# Patient Record
Sex: FEMALE | Race: ASIAN | NOT HISPANIC OR LATINO | URBAN - METROPOLITAN AREA
[De-identification: names, ages, dates, MRNs, and addresses within clinical notes are randomized per-mention and may not be internally consistent; named-entity substitution may affect disease eponyms.]

---

## 2017-05-25 ENCOUNTER — OFFICE VISIT - HEALTHEAST (OUTPATIENT)
Dept: FAMILY MEDICINE | Facility: CLINIC | Age: 62
End: 2017-05-25

## 2017-05-25 ENCOUNTER — RECORDS - HEALTHEAST (OUTPATIENT)
Dept: MAMMOGRAPHY | Facility: CLINIC | Age: 62
End: 2017-05-25

## 2017-05-25 DIAGNOSIS — Z00.00 ENCOUNTER FOR GENERAL ADULT MEDICAL EXAMINATION WITHOUT ABNORMAL FINDINGS: ICD-10-CM

## 2017-05-25 DIAGNOSIS — Z00.00 ROUTINE GENERAL MEDICAL EXAMINATION AT A HEALTH CARE FACILITY: ICD-10-CM

## 2017-05-25 DIAGNOSIS — L72.3 SEBACEOUS CYST: ICD-10-CM

## 2017-05-25 DIAGNOSIS — Z12.31 ENCOUNTER FOR SCREENING MAMMOGRAM FOR MALIGNANT NEOPLASM OF BREAST: ICD-10-CM

## 2017-05-25 ASSESSMENT — MIFFLIN-ST. JEOR: SCORE: 890.72

## 2017-05-30 ENCOUNTER — AMBULATORY - HEALTHEAST (OUTPATIENT)
Dept: LAB | Facility: CLINIC | Age: 62
End: 2017-05-30

## 2017-05-30 DIAGNOSIS — Z00.00 ROUTINE GENERAL MEDICAL EXAMINATION AT A HEALTH CARE FACILITY: ICD-10-CM

## 2017-05-30 LAB
CHOLEST SERPL-MCNC: 227 MG/DL
FASTING STATUS PATIENT QL REPORTED: YES
HAV IGG SER QL IA: POSITIVE
HDLC SERPL-MCNC: 77 MG/DL
LDLC SERPL CALC-MCNC: 137 MG/DL
TRIGL SERPL-MCNC: 63 MG/DL

## 2017-05-31 LAB
HBV SURFACE AB SERPL IA-ACNC: NEGATIVE M[IU]/ML
HPV INTERPRETATION - HISTORICAL: NORMAL
HPV INTERPRETER - HISTORICAL: NORMAL

## 2017-06-01 ENCOUNTER — COMMUNICATION - HEALTHEAST (OUTPATIENT)
Dept: FAMILY MEDICINE | Facility: CLINIC | Age: 62
End: 2017-06-01

## 2017-06-01 LAB
BKR LAB AP ABNORMAL BLEEDING: NO
BKR LAB AP BIRTH CONTROL/HORMONES: NORMAL
BKR LAB AP CERVICAL APPEARANCE: NORMAL
BKR LAB AP GYN ADEQUACY: NORMAL
BKR LAB AP GYN INTERPRETATION: NORMAL
BKR LAB AP HPV REFLEX: NORMAL
BKR LAB AP LMP: NORMAL
BKR LAB AP PATIENT STATUS: NORMAL
BKR LAB AP PREVIOUS ABNORMAL: NORMAL
BKR LAB AP PREVIOUS NORMAL: 2013
HIGH RISK?: NO
PATH REPORT.COMMENTS IMP SPEC: NORMAL
RESULT FLAG (HE HISTORICAL CONVERSION): NORMAL

## 2017-06-11 ENCOUNTER — COMMUNICATION - HEALTHEAST (OUTPATIENT)
Dept: FAMILY MEDICINE | Facility: CLINIC | Age: 62
End: 2017-06-11

## 2017-06-12 ENCOUNTER — COMMUNICATION - HEALTHEAST (OUTPATIENT)
Dept: TELEHEALTH | Facility: CLINIC | Age: 62
End: 2017-06-12

## 2017-06-19 ENCOUNTER — AMBULATORY - HEALTHEAST (OUTPATIENT)
Dept: FAMILY MEDICINE | Facility: CLINIC | Age: 62
End: 2017-06-19

## 2017-06-19 DIAGNOSIS — Z23 NEED FOR HEPATITIS VACCINATION: ICD-10-CM

## 2017-06-20 ENCOUNTER — AMBULATORY - HEALTHEAST (OUTPATIENT)
Dept: NURSING | Facility: CLINIC | Age: 62
End: 2017-06-20

## 2017-06-20 DIAGNOSIS — Z23 NEED FOR VACCINATION AGAINST HEPATITIS B VIRUS: ICD-10-CM

## 2017-07-28 ENCOUNTER — AMBULATORY - HEALTHEAST (OUTPATIENT)
Dept: LAB | Facility: CLINIC | Age: 62
End: 2017-07-28

## 2017-07-28 DIAGNOSIS — Z00.00 ROUTINE GENERAL MEDICAL EXAMINATION AT A HEALTH CARE FACILITY: ICD-10-CM

## 2017-08-02 ENCOUNTER — COMMUNICATION - HEALTHEAST (OUTPATIENT)
Dept: FAMILY MEDICINE | Facility: CLINIC | Age: 62
End: 2017-08-02

## 2017-08-25 ENCOUNTER — COMMUNICATION - HEALTHEAST (OUTPATIENT)
Dept: TELEHEALTH | Facility: CLINIC | Age: 62
End: 2017-08-25

## 2017-08-25 ENCOUNTER — AMBULATORY - HEALTHEAST (OUTPATIENT)
Dept: NURSING | Facility: CLINIC | Age: 62
End: 2017-08-25

## 2017-08-25 DIAGNOSIS — Z23 NEED FOR HEPATITIS VACCINATION: ICD-10-CM

## 2017-09-15 ENCOUNTER — COMMUNICATION - HEALTHEAST (OUTPATIENT)
Dept: FAMILY MEDICINE | Facility: CLINIC | Age: 62
End: 2017-09-15

## 2017-10-03 ENCOUNTER — COMMUNICATION - HEALTHEAST (OUTPATIENT)
Dept: FAMILY MEDICINE | Facility: CLINIC | Age: 62
End: 2017-10-03

## 2017-12-28 ENCOUNTER — COMMUNICATION - HEALTHEAST (OUTPATIENT)
Dept: FAMILY MEDICINE | Facility: CLINIC | Age: 62
End: 2017-12-28

## 2018-01-12 ENCOUNTER — AMBULATORY - HEALTHEAST (OUTPATIENT)
Dept: NURSING | Facility: CLINIC | Age: 63
End: 2018-01-12

## 2018-01-12 DIAGNOSIS — Z23 NEED FOR HEPATITIS VACCINATION: ICD-10-CM

## 2018-05-25 ENCOUNTER — COMMUNICATION - HEALTHEAST (OUTPATIENT)
Dept: FAMILY MEDICINE | Facility: CLINIC | Age: 63
End: 2018-05-25

## 2018-05-25 DIAGNOSIS — Z00.00 HEALTH CARE MAINTENANCE: ICD-10-CM

## 2018-06-01 ENCOUNTER — AMBULATORY - HEALTHEAST (OUTPATIENT)
Dept: LAB | Facility: CLINIC | Age: 63
End: 2018-06-01

## 2018-06-01 DIAGNOSIS — Z00.00 HEALTH CARE MAINTENANCE: ICD-10-CM

## 2018-06-01 LAB
CHOLEST SERPL-MCNC: 221 MG/DL
FASTING STATUS PATIENT QL REPORTED: YES
FASTING STATUS PATIENT QL REPORTED: YES
GLUCOSE BLD-MCNC: 90 MG/DL (ref 70–99)
HDLC SERPL-MCNC: 75 MG/DL
LDLC SERPL CALC-MCNC: 124 MG/DL
TRIGL SERPL-MCNC: 108 MG/DL

## 2018-06-04 LAB — HCV AB SERPL QL IA: NEGATIVE

## 2018-06-06 ENCOUNTER — COMMUNICATION - HEALTHEAST (OUTPATIENT)
Dept: FAMILY MEDICINE | Facility: CLINIC | Age: 63
End: 2018-06-06

## 2018-06-06 DIAGNOSIS — Z12.11 COLON CANCER SCREENING: ICD-10-CM

## 2018-06-29 ENCOUNTER — OFFICE VISIT - HEALTHEAST (OUTPATIENT)
Dept: FAMILY MEDICINE | Facility: CLINIC | Age: 63
End: 2018-06-29

## 2018-06-29 DIAGNOSIS — Z00.00 ROUTINE GENERAL MEDICAL EXAMINATION AT A HEALTH CARE FACILITY: ICD-10-CM

## 2018-06-29 ASSESSMENT — MIFFLIN-ST. JEOR: SCORE: 906.04

## 2018-10-08 ENCOUNTER — COMMUNICATION - HEALTHEAST (OUTPATIENT)
Dept: FAMILY MEDICINE | Facility: CLINIC | Age: 63
End: 2018-10-08

## 2019-01-10 ENCOUNTER — COMMUNICATION - HEALTHEAST (OUTPATIENT)
Dept: FAMILY MEDICINE | Facility: CLINIC | Age: 64
End: 2019-01-10

## 2019-01-10 DIAGNOSIS — L65.9 HAIR LOSS: ICD-10-CM

## 2019-02-26 ENCOUNTER — RECORDS - HEALTHEAST (OUTPATIENT)
Dept: ADMINISTRATIVE | Facility: OTHER | Age: 64
End: 2019-02-26

## 2019-07-02 ENCOUNTER — OFFICE VISIT - HEALTHEAST (OUTPATIENT)
Dept: FAMILY MEDICINE | Facility: CLINIC | Age: 64
End: 2019-07-02

## 2019-07-02 ENCOUNTER — RECORDS - HEALTHEAST (OUTPATIENT)
Dept: MAMMOGRAPHY | Facility: CLINIC | Age: 64
End: 2019-07-02

## 2019-07-02 DIAGNOSIS — Z12.31 ENCOUNTER FOR SCREENING MAMMOGRAM FOR MALIGNANT NEOPLASM OF BREAST: ICD-10-CM

## 2019-07-02 DIAGNOSIS — Z12.31 VISIT FOR SCREENING MAMMOGRAM: ICD-10-CM

## 2019-07-02 DIAGNOSIS — Z00.00 ROUTINE GENERAL MEDICAL EXAMINATION AT A HEALTH CARE FACILITY: ICD-10-CM

## 2019-07-02 LAB
CHOLEST SERPL-MCNC: 264 MG/DL
FASTING STATUS PATIENT QL REPORTED: YES
FASTING STATUS PATIENT QL REPORTED: YES
GLUCOSE BLD-MCNC: 86 MG/DL (ref 70–99)
HDLC SERPL-MCNC: 91 MG/DL
LDLC SERPL CALC-MCNC: 150 MG/DL
TRIGL SERPL-MCNC: 117 MG/DL
TSH SERPL DL<=0.005 MIU/L-ACNC: 1.8 UIU/ML (ref 0.3–5)

## 2019-07-02 ASSESSMENT — MIFFLIN-ST. JEOR: SCORE: 900.36

## 2019-07-03 ENCOUNTER — COMMUNICATION - HEALTHEAST (OUTPATIENT)
Dept: FAMILY MEDICINE | Facility: CLINIC | Age: 64
End: 2019-07-03

## 2019-07-03 LAB — 25(OH)D3 SERPL-MCNC: 24.9 NG/ML (ref 30–80)

## 2019-11-13 ENCOUNTER — AMBULATORY - HEALTHEAST (OUTPATIENT)
Dept: NURSING | Facility: CLINIC | Age: 64
End: 2019-11-13

## 2019-11-13 DIAGNOSIS — Z23 FLU VACCINE NEED: ICD-10-CM

## 2020-01-24 ENCOUNTER — RECORDS - HEALTHEAST (OUTPATIENT)
Dept: ADMINISTRATIVE | Facility: OTHER | Age: 65
End: 2020-01-24

## 2020-03-27 ENCOUNTER — COMMUNICATION - HEALTHEAST (OUTPATIENT)
Dept: FAMILY MEDICINE | Facility: CLINIC | Age: 65
End: 2020-03-27

## 2020-04-13 ENCOUNTER — COMMUNICATION - HEALTHEAST (OUTPATIENT)
Dept: FAMILY MEDICINE | Facility: CLINIC | Age: 65
End: 2020-04-13

## 2020-04-14 ENCOUNTER — OFFICE VISIT - HEALTHEAST (OUTPATIENT)
Dept: FAMILY MEDICINE | Facility: CLINIC | Age: 65
End: 2020-04-14

## 2020-04-14 DIAGNOSIS — H61.21 IMPACTED CERUMEN OF RIGHT EAR: ICD-10-CM

## 2020-04-17 ENCOUNTER — OFFICE VISIT - HEALTHEAST (OUTPATIENT)
Dept: FAMILY MEDICINE | Facility: CLINIC | Age: 65
End: 2020-04-17

## 2020-04-17 DIAGNOSIS — H61.21 IMPACTED CERUMEN OF RIGHT EAR: ICD-10-CM

## 2020-04-24 ENCOUNTER — RECORDS - HEALTHEAST (OUTPATIENT)
Dept: ADMINISTRATIVE | Facility: OTHER | Age: 65
End: 2020-04-24

## 2020-06-25 ENCOUNTER — COMMUNICATION - HEALTHEAST (OUTPATIENT)
Dept: FAMILY MEDICINE | Facility: CLINIC | Age: 65
End: 2020-06-25

## 2020-07-06 ENCOUNTER — OFFICE VISIT - HEALTHEAST (OUTPATIENT)
Dept: FAMILY MEDICINE | Facility: CLINIC | Age: 65
End: 2020-07-06

## 2020-07-06 DIAGNOSIS — H61.21 IMPACTED CERUMEN OF RIGHT EAR: ICD-10-CM

## 2020-07-06 DIAGNOSIS — Z71.89 ADVANCED DIRECTIVES, COUNSELING/DISCUSSION: ICD-10-CM

## 2020-07-06 DIAGNOSIS — Z11.4 ENCOUNTER FOR SCREENING FOR HIV: ICD-10-CM

## 2020-07-06 DIAGNOSIS — Z12.11 COLON CANCER SCREENING: ICD-10-CM

## 2020-07-06 DIAGNOSIS — R00.0 TACHYCARDIA: ICD-10-CM

## 2020-07-06 DIAGNOSIS — E55.9 VITAMIN D DEFICIENCY: ICD-10-CM

## 2020-07-06 DIAGNOSIS — Z23 ENCOUNTER FOR IMMUNIZATION: ICD-10-CM

## 2020-07-06 DIAGNOSIS — Z00.01 ENCOUNTER FOR GENERAL ADULT MEDICAL EXAMINATION WITH ABNORMAL FINDINGS: ICD-10-CM

## 2020-07-06 DIAGNOSIS — Z13.6 ENCOUNTER FOR SCREENING FOR CARDIOVASCULAR DISORDERS: ICD-10-CM

## 2020-07-06 DIAGNOSIS — Z78.0 ASYMPTOMATIC MENOPAUSAL STATE: ICD-10-CM

## 2020-07-06 LAB
ALBUMIN SERPL-MCNC: 3.8 G/DL (ref 3.5–5)
ALP SERPL-CCNC: 60 U/L (ref 45–120)
ALT SERPL W P-5'-P-CCNC: 13 U/L (ref 0–45)
ANION GAP SERPL CALCULATED.3IONS-SCNC: 10 MMOL/L (ref 5–18)
AST SERPL W P-5'-P-CCNC: 25 U/L (ref 0–40)
BILIRUB SERPL-MCNC: 0.5 MG/DL (ref 0–1)
BUN SERPL-MCNC: 8 MG/DL (ref 8–22)
CALCIUM SERPL-MCNC: 9.6 MG/DL (ref 8.5–10.5)
CHLORIDE BLD-SCNC: 108 MMOL/L (ref 98–107)
CHOLEST SERPL-MCNC: 240 MG/DL
CO2 SERPL-SCNC: 24 MMOL/L (ref 22–31)
CREAT SERPL-MCNC: 0.71 MG/DL (ref 0.6–1.1)
ERYTHROCYTE [DISTWIDTH] IN BLOOD BY AUTOMATED COUNT: 10.9 % (ref 11–14.5)
FASTING STATUS PATIENT QL REPORTED: YES
GFR SERPL CREATININE-BSD FRML MDRD: >60 ML/MIN/1.73M2
GLUCOSE BLD-MCNC: 93 MG/DL (ref 70–125)
HCT VFR BLD AUTO: 40.1 % (ref 35–47)
HDLC SERPL-MCNC: 80 MG/DL
HGB BLD-MCNC: 13.5 G/DL (ref 12–16)
LDLC SERPL CALC-MCNC: 144 MG/DL
MCH RBC QN AUTO: 33.2 PG (ref 27–34)
MCHC RBC AUTO-ENTMCNC: 33.7 G/DL (ref 32–36)
MCV RBC AUTO: 99 FL (ref 80–100)
PLATELET # BLD AUTO: 245 THOU/UL (ref 140–440)
PMV BLD AUTO: 6.7 FL (ref 7–10)
POTASSIUM BLD-SCNC: 3.8 MMOL/L (ref 3.5–5)
PROT SERPL-MCNC: 7.3 G/DL (ref 6–8)
RBC # BLD AUTO: 4.07 MILL/UL (ref 3.8–5.4)
SODIUM SERPL-SCNC: 142 MMOL/L (ref 136–145)
TRIGL SERPL-MCNC: 81 MG/DL
TSH SERPL DL<=0.005 MIU/L-ACNC: 1.14 UIU/ML (ref 0.3–5)
WBC: 3.6 THOU/UL (ref 4–11)

## 2020-07-06 ASSESSMENT — MIFFLIN-ST. JEOR: SCORE: 904.89

## 2020-07-07 LAB
25(OH)D3 SERPL-MCNC: 34.7 NG/ML (ref 30–80)
25(OH)D3 SERPL-MCNC: 34.7 NG/ML (ref 30–80)

## 2020-07-09 LAB
ATRIAL RATE - MUSE: 80 BPM
DIASTOLIC BLOOD PRESSURE - MUSE: NORMAL
INTERPRETATION ECG - MUSE: NORMAL
P AXIS - MUSE: 80 DEGREES
PR INTERVAL - MUSE: 156 MS
QRS DURATION - MUSE: 78 MS
QT - MUSE: 402 MS
QTC - MUSE: 463 MS
R AXIS - MUSE: 66 DEGREES
SYSTOLIC BLOOD PRESSURE - MUSE: NORMAL
T AXIS - MUSE: 62 DEGREES
VENTRICULAR RATE- MUSE: 80 BPM

## 2020-07-23 ENCOUNTER — COMMUNICATION - HEALTHEAST (OUTPATIENT)
Dept: FAMILY MEDICINE | Facility: CLINIC | Age: 65
End: 2020-07-23

## 2020-09-18 ENCOUNTER — COMMUNICATION - HEALTHEAST (OUTPATIENT)
Dept: FAMILY MEDICINE | Facility: CLINIC | Age: 65
End: 2020-09-18

## 2020-09-23 ENCOUNTER — COMMUNICATION - HEALTHEAST (OUTPATIENT)
Dept: SCHEDULING | Facility: CLINIC | Age: 65
End: 2020-09-23

## 2021-03-25 ENCOUNTER — COMMUNICATION - HEALTHEAST (OUTPATIENT)
Dept: FAMILY MEDICINE | Facility: CLINIC | Age: 66
End: 2021-03-25

## 2021-05-25 ENCOUNTER — RECORDS - HEALTHEAST (OUTPATIENT)
Dept: ADMINISTRATIVE | Facility: CLINIC | Age: 66
End: 2021-05-25

## 2021-05-26 ENCOUNTER — RECORDS - HEALTHEAST (OUTPATIENT)
Dept: ADMINISTRATIVE | Facility: CLINIC | Age: 66
End: 2021-05-26

## 2021-05-27 ENCOUNTER — RECORDS - HEALTHEAST (OUTPATIENT)
Dept: ADMINISTRATIVE | Facility: CLINIC | Age: 66
End: 2021-05-27

## 2021-05-28 ENCOUNTER — RECORDS - HEALTHEAST (OUTPATIENT)
Dept: ADMINISTRATIVE | Facility: CLINIC | Age: 66
End: 2021-05-28

## 2021-05-29 ENCOUNTER — RECORDS - HEALTHEAST (OUTPATIENT)
Dept: ADMINISTRATIVE | Facility: CLINIC | Age: 66
End: 2021-05-29

## 2021-05-30 ENCOUNTER — RECORDS - HEALTHEAST (OUTPATIENT)
Dept: ADMINISTRATIVE | Facility: CLINIC | Age: 66
End: 2021-05-30

## 2021-05-30 NOTE — PATIENT INSTRUCTIONS - HE
Ask you insurance about coverage for shingles shot- may need to get at pharmacy.     Called [facility]- will need to get on the waiting list at you pharmacy

## 2021-05-30 NOTE — PROGRESS NOTES
[facility] Health Maintenance Exam  [facility]      Assessment/Plan     1.  Healthy female exam.   Health maintenance discussed as appropriate for age and risk factors including:  Nutrition, exercise, alcohol use, tobacco use, safe sexual practices, dental health.  Cancer screening assessed and ordered as indicated. Routine labs as outlined below based on age and risk factors reviewed today. Immunizations reviewed and updated.       1. Routine general medical examination at a health care facility  - Glucose  - Lipid Cascade  - Vitamin D, Total (25-Hydroxy)  - Thyroid Cascade    2. Visit for screening mammogram  - Mammo Screening Bilateral; Future      Return in about 1 year (around 7/2/2020) for Annual physical.    Patient Education/AVS:  Patient Instructions   Ask you insurance about coverage for shingles shot- may need to get at pharmacy.     Called [facility]- will need to get on the waiting list at you pharmacy      HPI:      Chief Complaint   Patient presents with     Annual Exam     fasting, able to do mammo today        Gloria Quinn is a 63 y.o. female and is here for a comprehensive physical exam.     Other concerns today:   No ear wax in past 6 months but wants right ear checked again    Did see dermatology about her hair loss- rx for 2 ointments/steroids but not really helpful.  Has been recommended to try rogaine but has had allergic reaction to this in the past.  Not able to do skin testing for this med.  Will discuss other options in the future.  Hair loss is steady slowly getting worse in past 1-2 years.     Did not do cologard yet- hoping to do this fall once Medicare is started    Healthy Habits: Body mass index is 20.96 kg/m .  I have had an Advance Directives discussion with the patient.  Regular Exercise: treadmill 30min several days a week  Healthy Diet:some sweets balanced in volume, fruits/veggies daily, limited dairy  Smoking:   Social History     Tobacco Use   Smoking Status  Passive Smoke Exposure - Never Smoker   Smokeless Tobacco Never Used   Tobacco Comment    neighbors smoke     Dental Visits Regularly: Yes  Eye exams: Yes  Pregnancy planning: na   Social History     Substance and Sexual Activity   Sexual Activity Never     Seat Belt: Yes  Prevention of Osteoporosis:calcium/vitamin D supplement and running treadmill  Last Dexa:due this year when 64    Do you take any herbs or supplements that were not prescribed by a doctor? Plant based mvi with calcium and calcium with vitamin D       Cancer Screening:  Hemoccults/Colonoscopy: cologard this year  Mammogram:  Due today  Pap Smear:  completed  Lung Cancer: low risk      Immunization History   Administered Date(s) Administered     Hep B, Adult 2017, 2017, 2018     Hepatitis A: Immune 2017     Influenza,seasonal quad, PF, 36+MOS 2016     Td, adult adsorbed, PF 10/01/1999, 2018     Td,adult,historic,unspecified 1954     Tdap 2009     ZOSTER, LIVE 2017       OB History    Para Term  AB Living   2 2 2     2   SAB TAB Ectopic Multiple Live Births                  # Outcome Date GA Lbr [place]/2nd Weight Sex Delivery Anes PTL Lv   2 Term            1 Term                Meds  Current Outpatient Medications on File Prior to Visit   Medication Sig Dispense Refill     fluocinonide (LIDEX) 0.05 % external solution APPLY TOPICALLY TO SCALP NIGHTLY AT BEDTIME.  3     mometasone (ELOCON) 0.1 % lotion APPLY 1 APPLICATION TOPICALLY TO SCALP ONCE NIGHTLY AT BEDTIME AS NEEDED  3     No current facility-administered medications on file prior to visit.        Past Medical History  Past Medical History:   Diagnosis Date     Breast cyst     right       Surgical History  Past Surgical History:   Procedure Laterality Date     BREAST BIOPSY Right        Allergies  Patient has no known allergies.    Family History  Family History   Problem Relation Age of Onset     Cancer Mother       Hypertension Father      Diabetes Father      Heart disease Father      Dementia Sister      Diabetes Sister      Hypothyroidism Sister      No Medical Problems Brother      No Medical Problems Sister      No Medical Problems Sister      No Medical Problems Sister      No Medical Problems Sister      No Medical Problems Sister      No Medical Problems Brother      No Medical Problems Brother        Social History  Social History     Socioeconomic History     Marital status:      Spouse name: Not on file     Number of children: 2     Years of education: Not on file     Highest education level: Not on file   Occupational History     Occupation: retired- [employer]     Occupation: Volunteering with [employer]   Social Needs     Financial resource strain: Not very hard     Food insecurity:     Worry: Never true     Inability: Never true     Transportation needs:     Medical: No     Non-medical: No   Tobacco Use     Smoking status: Passive Smoke Exposure - Never Smoker     Smokeless tobacco: Never Used     Tobacco comment: neighbors smoke   Substance and Sexual Activity     Alcohol use: No     Drug use: No     Sexual activity: Never   Lifestyle     Physical activity:     Days per week: 4 days     Minutes per session: 30 min     Stress: Only a little   Relationships     Social connections:     Talks on phone: Not on file     Gets together: Not on file     Attends Religion service: Not on file     Active member of club or organization: Not on file     Attends meetings of clubs or organizations: Not on file     Relationship status: Not on file     Intimate partner violence:     Fear of current or ex partner: Not on file     Emotionally abused: Not on file     Physically abused: Not on file     Forced sexual activity: Not on file   Other Topics Concern     Not on file   Social History Narrative    Lives with daughter         Review of Systems   Complete ROS including General, HEENT, CV, Pulmonary, GI, ,  "Genital, Neuro, Psych, MSK, Heme, Endocrine all within normal except as noted in the HPI.      Objective:   /80 (Patient Site: Right Arm, Patient Position: Sitting, Cuff Size: Adult Small)   Pulse 86   Resp 16   Ht 4' 10.5\" (1.486 m)   Wt 102 lb (46.3 kg)   LMP 05/25/2013   BMI 20.96 kg/m   Body mass index is 20.96 kg/m .  Wt Readings from Last 3 Encounters:   07/02/19 102 lb (46.3 kg)   06/29/18 102 lb (46.3 kg)   05/25/17 (!) 99 lb (44.9 kg)     PHQ-9 Total Score: 0 (7/2/2019  9:00 AM)    No data recorded  PHQ-2 Total Score: 0 (7/2/2019  9:00 AM)      Complete physical exam including:  General, HEENT, Neck, breast, back, CV, Pulmonary, Abdominal, extremities, skin, neuro, psych, lymph, genital exams all within normal.    Abnormalities include:  Grossly normal exam today.  No gyn exam today due to lack of sx, not due for screening.      Results for orders placed or performed in visit on 07/02/19   Glucose   Result Value Ref Range    Glucose 86 70 - 99 mg/dL    Patient Fasting > 8hrs? Yes      "

## 2021-05-30 NOTE — PROGRESS NOTES
Requested medical records from [facility] and received the records shortly after and gave them to Dr. Roldan.

## 2021-05-31 VITALS — WEIGHT: 99 LBS | HEIGHT: 59 IN | BODY MASS INDEX: 19.96 KG/M2

## 2021-06-01 ENCOUNTER — RECORDS - HEALTHEAST (OUTPATIENT)
Dept: ADMINISTRATIVE | Facility: CLINIC | Age: 66
End: 2021-06-01

## 2021-06-01 VITALS — BODY MASS INDEX: 20.56 KG/M2 | WEIGHT: 102 LBS | HEIGHT: 59 IN

## 2021-06-03 VITALS — HEIGHT: 59 IN | BODY MASS INDEX: 20.56 KG/M2 | WEIGHT: 102 LBS

## 2021-06-04 VITALS
HEART RATE: 108 BPM | BODY MASS INDEX: 21.37 KG/M2 | WEIGHT: 104 LBS | DIASTOLIC BLOOD PRESSURE: 82 MMHG | SYSTOLIC BLOOD PRESSURE: 125 MMHG | RESPIRATION RATE: 16 BRPM

## 2021-06-04 VITALS
HEIGHT: 59 IN | DIASTOLIC BLOOD PRESSURE: 78 MMHG | SYSTOLIC BLOOD PRESSURE: 130 MMHG | HEART RATE: 112 BPM | WEIGHT: 103 LBS | BODY MASS INDEX: 20.76 KG/M2 | TEMPERATURE: 98.6 F

## 2021-06-07 NOTE — PROGRESS NOTES
"ASSESSMENT/PLAN:  1. Impacted cerumen of right ear  Ear cerumen removal       This is a 63 yo female with recurrent impacted cerumen - right ear.  She is symptomatic with \"pressure\", not so much pain.  I was able to remove a large amount of fairly soft wax from the right ear canal (left was completely clear).  There remained some circumferential wax in the canal as well as a spot of slightly deeper cerumen.  This was removed by ear lavage.  No complications.      Return for symptomatic cerumen impaction.      There are no discontinued medications.  There are no Patient Instructions on file for this visit.    Chief Complaint:  Chief Complaint   Patient presents with     Cerumen Impaction     right ear wax       HPI:   Gloria Quinn is a 64 y.o. female c/o  Here for pain/discomfort right ear  Feels pressure in ear  H/o cerumen impaction  Feels like there is a \"rock\" on right side of face       PMH:   Patient Active Problem List    Diagnosis Date Noted     Impacted cerumen of right ear      Past Medical History:   Diagnosis Date     Breast cyst     right     Past Surgical History:   Procedure Laterality Date     BREAST BIOPSY Right 2001     Social History     Socioeconomic History     Marital status:      Spouse name: Not on file     Number of children: 2     Years of education: Not on file     Highest education level: Not on file   Occupational History     Occupation: retired- [employer]     Occupation: Volunteering with [employer]   Social Needs     Financial resource strain: Not very hard     Food insecurity     Worry: Never true     Inability: Never true     Transportation needs     Medical: No     Non-medical: No   Tobacco Use     Smoking status: Passive Smoke Exposure - Never Smoker     Smokeless tobacco: Never Used     Tobacco comment: neighbors smoke   Substance and Sexual Activity     Alcohol use: No     Drug use: No     Sexual activity: Never   Lifestyle     Physical activity     Days per " week: 4 days     Minutes per session: 30 min     Stress: Only a little   Relationships     Social connections     Talks on phone: Not on file     Gets together: Not on file     Attends Confucianism service: Not on file     Active member of club or organization: Not on file     Attends meetings of clubs or organizations: Not on file     Relationship status: Not on file     Intimate partner violence     Fear of current or ex partner: Not on file     Emotionally abused: Not on file     Physically abused: Not on file     Forced sexual activity: Not on file   Other Topics Concern     Not on file   Social History Narrative    Lives with daughter     Family History   Problem Relation Age of Onset     Cancer Mother      Hypertension Father      Diabetes Father      Heart disease Father      Dementia Sister      Diabetes Sister      Hypothyroidism Sister      No Medical Problems Brother      No Medical Problems Sister      No Medical Problems Sister      No Medical Problems Sister      No Medical Problems Sister      No Medical Problems Sister      No Medical Problems Brother      No Medical Problems Brother        Meds:    Current Outpatient Medications:      fluocinonide (LIDEX) 0.05 % external solution, APPLY TOPICALLY TO SCALP NIGHTLY AT BEDTIME., Disp: , Rfl: 3     mometasone (ELOCON) 0.1 % lotion, APPLY 1 APPLICATION TOPICALLY TO SCALP ONCE NIGHTLY AT BEDTIME AS NEEDED, Disp: , Rfl: 3    Allergies:  No Known Allergies    ROS:  Pertinent positives as noted in HPI; otherwise 12 point ROS negative.      Physical Exam:  EXAM:  /82 (Patient Site: Left Arm, Patient Position: Sitting, Cuff Size: Adult Small)   Pulse (!) 108   Resp 16   Wt 104 lb (47.2 kg)   LMP 05/25/2013   BMI 21.37 kg/m     Gen:  NAD, appears well, well-hydrated  HEENT:  TMs :  Right auditory canal completely plugged with soft cerumen, left clear, oropharynx benign, nasal mucosa nl, conjunctiva clear  Neck:  Supple, no adenopathy, no thyromegaly, no  carotid bruits, no JVD  Lungs:  Clear to auscultation bilaterally  Cor:  RRR no murmur  Extr:  Neg.  Neuro:  No asymmetry  Skin:  Warm/dry        Results:  Results for orders placed or performed in visit on 07/02/19   Glucose   Result Value Ref Range    Glucose 86 70 - 99 mg/dL    Patient Fasting > 8hrs? Yes    Lipid Cascade   Result Value Ref Range    Cholesterol 264 (H) <=199 mg/dL    Triglycerides 117 <=149 mg/dL    HDL Cholesterol 91 >=50 mg/dL    LDL Calculated 150 (H) <=129 mg/dL    Patient Fasting > 8hrs? Yes    Vitamin D, Total (25-Hydroxy)   Result Value Ref Range    Vitamin D, Total (25-Hydroxy) 24.9 (L) 30.0 - 80.0 ng/mL   Thyroid Cascade   Result Value Ref Range    TSH 1.80 0.30 - 5.00 uIU/mL

## 2021-06-07 NOTE — PROGRESS NOTES
"Gloria Quinn is a 64 y.o. female who is being evaluated via a billable telephone visit.      The patient has been notified of following:     \"This telephone visit will be conducted via a call between you and your physician/provider. We have found that certain health care needs can be provided without the need for a physical exam.  This service lets us provide the care you need with a short phone conversation.  If a prescription is necessary we can send it directly to your pharmacy.  If lab work is needed we can place an order for that and you can then stop by our lab to have the test done at a later time.    Telephone visits are billed at different rates depending on your insurance coverage. During this emergency period, for some insurers they may be billed the same as an in-person visit.  Please reach out to your insurance provider with any questions.    If during the course of the call the physician/provider feels a telephone visit is not appropriate, you will not be charged for this service.\"    Patient has given verbal consent to a Telephone visit? Yes    Patient would like to receive their AVS by AVS Preference: Aisha.    Additional provider notes:     Right ear is impacted again with ear wax  Knows this because she has this problem about once a year  When had her physical last year, had it cleaned (June 2019)  Now, it's back again -   Can't hear very well out of it - not painful, but pressure  Couldn't get it out with Debrox -     Assessment/Plan:  1. Impacted cerumen of right ear       This is a 65 yo female with h/o previous cerumen impaction - last irrigated in June 2019.  Now with similar symptoms - has not been successful with Debrox in past (\"maybe I did it wrong\").  I reviewed use of Debrox.  Will try Debrox for a few days - if no success, we will see her later this week for removal of cerumen.  Appointment will be made for 4/17.        Phone call duration:  6 minutes  1434 - 1440    "

## 2021-06-07 NOTE — TELEPHONE ENCOUNTER
Patient Returning Call  Reason for call:  Call Back  Information relayed to patient:  Pt still waiting to hear back if she needs a virtual visit or actual clinic visit.  Pt also asking if there is another medication she can use to break down wax other tahn Debrox? Pharmacy on file.  Please advise.  Patient has additional questions:  No  If YES, what are your questions/concerns:  N/A  Okay to leave a detailed message?: No

## 2021-06-07 NOTE — TELEPHONE ENCOUNTER
New Appointment Needed  What is the reason for the visit:    plugged ears - pressure. Wondering if pcp can recommend something at home for her to do or can she be seen in clinic.   Provider Preference: PCP only  How soon do you need to be seen?: today or tomorrow  Waitlist offered?: No  Okay to leave a detailed message:  Yes

## 2021-06-09 NOTE — PROGRESS NOTES
Assessment and Plan:     1. Vitamin D deficiency  Recheck level and replace as needed.    - Comprehensive Metabolic Panel  - Vitamin D, Total (25-Hydroxy)    2. Encounter for general adult medical examination with abnormal findings  Welcome to Medicare Exam today as below  - Electrocardiogram Perform and Read    3. Tachycardia  Noted on rooming and also on exam but after laying still for EKG heart rate came down.  EKG shows sinus rhythm per my read- will await formal reading.  Check thyroid and metabolic and anemia issues.    - Electrocardiogram Perform and Read  - Thyroid Cascade  - HM2(CBC w/o Differential)  - Lipid Cascade    4. Advanced directives, counseling/discussion  Pt has advance directive on file - reviewed and up to date per patient.      5. Encounter for screening for HIV  Pt declines- she finds she is lower risk.     6. Encounter for immunization  - Pneumococcal conjugate vaccine 13-valent 6wks-17yrs; >50yrs    7. Asymptomatic menopausal state  - DXA Bone Density Scan; Future    8. Colon cancer screening  - Cologuard    9. Impacted cerumen of right ear  Able to soften with wash and then remove wax with ear loop by provider.    - Ear cerumen removal; Future    10. Encounter for screening for cardiovascular disorders   - Lipid Cascade        The patient's current medical problems were reviewed.    The following health maintenance schedule was reviewed with the patient and provided in printed form in the after visit summary:   Health Maintenance   Topic Date Due     ZOSTER VACCINES (2 of 3) 07/20/2017     COLORECTAL CANCER SCREENING  07/31/2018     DXA SCAN  09/08/2019     MEDICARE ANNUAL WELLNESS VISIT  07/02/2020     INFLUENZA VACCINE RULE BASED (1) 08/01/2020     MAMMOGRAM  07/02/2021     PNEUMOCOCCAL IMMUNIZATION 65+ LOW/MEDIUM RISK (2 of 2 - PPSV23) 07/06/2021     FALL RISK ASSESSMENT  07/06/2021     LIPID  07/06/2025     ADVANCE CARE PLANNING  07/06/2025     TD 18+ HE  06/29/2028     HEPATITIS C  SCREENING  Completed     TDAP ADULT ONE TIME DOSE  Completed     HIV SCREENING  Discontinued        Subjective:   Chief Complaint: Gloria Quinn is an 64 y.o. female here for a Welcome to Medicare visit.   HPI:  Routine tests needed  Due for vaccines, fasting labs- cholesterol, etc    Healthy Habits: Body mass index is 21.16 kg/m .  Regular Exercise: limited due to Covid- use to go to [employer] 5 days/week 3 mile walk along with stretching and strengthening.    Healthy Diet:fruits/veggies daily, limited meat weekly, lots of plant based- tofu, eggs, etc.    Smoking:   Social History     Tobacco Use   Smoking Status Passive Smoke Exposure - Never Smoker   Smokeless Tobacco Never Used   Tobacco Comment    neighbors smoke     Dental Visits Regularly: Yes  Eye exams: Yes  Pregnancy planning: na   Social History     Substance and Sexual Activity   Sexual Activity Not Currently     Birth control/protection: Post-menopausal     Seat Belt: Yes  Prevention of Osteoporosis:calcium supplement daily, milk daily  Last Dexa:due this year    Do you take any herbs or supplements that were not prescribed by a doctor? yes      Cancer Screening:  Hemoccults/Colonoscopy: hemocolt cards 2017- willing to do cologard this year.   Mammogram:  Normal 2017 and 2019- repeat 2021  Pap Smear:  na  Lung Cancer: na      Review of Systems:   Please see above.  The rest of the review of systems are negative for all systems.    Patient Care Team:  Nakia Roldan MD as PCP - General (Family Medicine)  Nakia Roldan MD as Assigned PCP     Patient Active Problem List   Diagnosis     Impacted cerumen of right ear     Alopecia areata     Vitamin D deficiency     Advanced directives, counseling/discussion     Neutropenia (H)     Past Medical History:   Diagnosis Date     Breast cyst     right      Past Surgical History:   Procedure Laterality Date     BREAST BIOPSY Right 2001      Family History   Problem Relation Age of Onset     Cancer  Mother      Hypertension Father      Diabetes Father      Heart disease Father      Dementia Sister      Diabetes Sister      Hypothyroidism Sister      No Medical Problems Brother      No Medical Problems Sister      No Medical Problems Sister      No Medical Problems Sister      No Medical Problems Sister      No Medical Problems Sister      No Medical Problems Brother      No Medical Problems Brother       Social History     Socioeconomic History     Marital status:      Spouse name: Not on file     Number of children: 2     Years of education: Not on file     Highest education level: Not on file   Occupational History     Occupation: retired- [employer]   Social Needs     Financial resource strain: Not very hard     Food insecurity     Worry: Never true     Inability: Never true     Transportation needs     Medical: No     Non-medical: No   Tobacco Use     Smoking status: Passive Smoke Exposure - Never Smoker     Smokeless tobacco: Never Used     Tobacco comment: neighbors smoke   Substance and Sexual Activity     Alcohol use: No     Drug use: No     Sexual activity: Not Currently     Birth control/protection: Post-menopausal   Lifestyle     Physical activity     Days per week: 4 days     Minutes per session: 30 min     Stress: Only a little   Relationships     Social connections     Talks on phone: Not on file     Gets together: Not on file     Attends Synagogue service: Not on file     Active member of club or organization: Not on file     Attends meetings of clubs or organizations: Not on file     Relationship status:      Intimate partner violence     Fear of current or ex partner: Not on file     Emotionally abused: Not on file     Physically abused: Not on file     Forced sexual activity: Not on file   Other Topics Concern     Not on file   Social History Narrative    Lives with daughter       Current Outpatient Medications   Medication Sig Dispense Refill     calcium carbonate  "(OS-CHE) 600 mg calcium (1,500 mg) tablet Take 600 mg by mouth daily.       cholecalciferol, vitamin D3, 1,000 unit (25 mcg) tablet Take 1,000 Units by mouth daily.       No current facility-administered medications for this visit.       Objective:   Vital Signs:   Visit Vitals  /78   Pulse (!) 112   Temp 98.6  F (37  C) (Oral)   Ht 4' 10.5\" (1.486 m)   Wt 103 lb (46.7 kg)   LMP 05/25/2013   BMI 21.16 kg/m      EKG:          VisionScreening:   Hearing Screening    Method: Audiometry    125Hz 250Hz 500Hz 1000Hz 2000Hz 3000Hz 4000Hz 6000Hz 8000Hz   Right ear:   25 20 20  20 45    Left ear:   25 20 25  30 55       Visual Acuity Screening    Right eye Left eye Both eyes   Without correction:      With correction: 10/16 10/32    Comments: Plus Lens: Pass: blurring of vision with +2.50 lens glasses       PHYSICAL EXAM  All normal as below except abnormalities include: right ear canal obstructed with thick dry cerumen.  Initially unable to remove with ear loop.  Soften with warm water and then able to remove most with ear loop.    General is a  64 y.o. female sitting comfortably in no apparent distress.   HEENT:  TM are clear bilaterally.  Eye, nasal, oral exams within normal   Neck: Supple without lymphadenopathy or thyromegally  CV: Regular rate and rhythm S1S2 without rubs, murmurs or gallops,   Lungs: Clear to auscultation bilaterally  Abd:  +BS, soft NT/ND,  No masses or organomegally,   Extremities: Warm, No Edema, 2+ Pedal and radial pulses bilaterally  Skin: No lesions or rashes noted  Neuro: Able to ambulate around the exam room with equal movement, strength and normal coordination of the upper and lower extremeties symmetrically  Pelvic:Not examined  Breast: Normal breast exam.  No nipple changes, breast appear symmetric without nodules/lumps or skin changes. No lymphadenopathy noted.      Assessment Results 7/6/2020   Activities of Daily Living No help needed   Instrumental Activities of Daily Living No " help needed   Mini Cog Total Score 4   Some recent data might be hidden     A Mini Cog score of 0-2 suggests the possibility of dementia, score of 3-5 suggests no dementia    Identified Health Risks:     She is at risk for lack of exercise and has been provided with information to increase physical activity for the benefit of her well-being.  The patient reports that she does not have all recommended working emergency equipment available. She was provided with information about emergency preparedness, including smoke detectors.  Patient's advanced directive was discussed and I am comfortable with the patient's wishes.

## 2021-06-09 NOTE — TELEPHONE ENCOUNTER
----- Message from Gonzalo Chavez CMA sent at 6/10/2020  8:17 AM CDT -----      ----- Message -----  From: Aristeo Martini MD  Sent: 6/9/2020   1:00 PM CDT  To: [facility]/Ob Support Pool    Please set up Annual Wellness visit virtual visit.

## 2021-06-10 NOTE — PROGRESS NOTES
[facility] Maintenance Exam  [facility]      Assessment/Plan     1.  Healthy female exam.   Health maintenance discussed as appropriate for age and risk factors including:  Nutrition, exercise, alcohol use, tobacco use, safe sexual practices, dental health.  Cancer screening assessed and ordered as indicated. Routine labs as outlined below based on age and risk factors reviewed today. Immunizations reviewed and updated.       1. Routine general medical examination at a health care facility  - Occult Blood(ICT); Future  - Gynecologic Cytology (PAP Smear)  - Lipid Cascade; Future  - Glucose; Future  - Vitamin D, Total (25-Hydroxy); Future  - Thyroid Cascade; Future  - HM2(CBC w/o Differential); Future  - Varicella-zoster vaccine subq  - Hepatitis B Surface Antibody (Anti-HBs) Vaccine Check; Future  - Hepatitis A Immune Status; Future  - HPV Cascade (PCR)    2. Sebaceous cyst  Left side of neck s/p manual drainage today without difficulty.      No Follow-up on file.    Patient Education/AVS:  Patient Instructions   Restart your calcium supplement (500-600mg)  Be sure to get 000- 2000u vitamin D daily      HPI:      Chief Complaint   Patient presents with     [facility]     Annual Exam        Gloria Quinn is a 61 y.o. female and is here for a comprehensive physical exam.     Other concerns today:   Get caught up on blood tests and vaccines if needed  Glucose  Cholesterol    Skin abscess on left shoulder recently.  Had a similar problem 2 years ago that ruptured and drained and then went away.  2-3 weeks ago with a lump like a blackhead that got irritated and then got bigger, painful and red.  Opened up and started draining pus.  Itchy at this point.  Has been keeping it covered with bandage.  Gets rubbed on by her bra and seat belt.        Healthy Habits: Body mass index is 20.17 kg/(m^2).  Regular Exercise: treadmill at times   Healthy Diet:limits fatty and salty foods, some fruit/veggies,  cheese and yogurt  Smoking:   History   Smoking Status     Never Smoker   Smokeless Tobacco     Never Used     Comment: No second hand smoking      Dental Visits Regularly: Yes  Eye exams: Yes  Pregnancy planning: na   History   Sexual Activity     Sexual activity: No     Seat Belt: Yes  Prevention of Osteoporosis:some exercise and limited dairy  Last Dexa:at 63yo    Do you take any herbs or supplements that were not prescribed by a doctor? no      Cancer Screening:  Hemoccults/Colonoscopy: never had  Mammogram:  Today   Pap Smear:  today  Lung Cancer: na      Immunization History   Administered Date(s) Administered     Influenza,seasonal quad, PF, 36+MOS 2016     Td, historic 1954     Tdap 2009     ZOSTER 2017       OB History    Para Term  AB Living   2 2 2   2   SAB TAB Ectopic Multiple Live Births             # Outcome Date GA Lbr [place]/2nd Weight Sex Delivery Anes PTL Lv   2 Term            1 Term                   Meds  No current outpatient prescriptions on file prior to visit.     No current facility-administered medications on file prior to visit.        Past Medical History  Past Medical History:   Diagnosis Date     Breast cyst     right       Surgical History  Past Surgical History:   Procedure Laterality Date     BREAST BIOPSY Right        Allergies  Review of patient's allergies indicates no known allergies.    Family History  Family History   Problem Relation Age of Onset     Hypertension Father      Diabetes Father      Diabetes Sister      Hypothyroidism Sister        Social History  Social History     Social History     Marital status:      Spouse name: N/A     Number of children: N/A     Years of education: N/A     Occupational History     retired- [employer]      Volunteering with [employer]      Social History Main Topics     Smoking status: Never Smoker     Smokeless tobacco: Never Used      Comment: No second hand smoking      Alcohol use No  "    Drug use: No     Sexual activity: No     Other Topics Concern     Not on file     Social History Narrative    Lives with daughter         Review of Systems   Complete ROS including General, HEENT, CV, Pulmonary, GI, , Genital, Neuro, Psych, MSK, Heme, Endocrine all within normal except as noted in the HPI.      Objective:   /62 (Patient Site: Right Arm, Patient Position: Sitting, Cuff Size: Adult Regular)  Pulse 84  Temp 97.8  F (36.6  C) (Oral)   Resp 20  Ht 4' 10.75\" (1.492 m)  Wt (!) 99 lb (44.9 kg)  LMP 05/25/2013  BMI 20.17 kg/m2 Body mass index is 20.17 kg/(m^2).  Wt Readings from Last 3 Encounters:   05/25/17 (!) 99 lb (44.9 kg)     No Data Recorded  No Data Recorded  PHQ-2 Total Score: 0 (5/25/2017  1:00 PM)  Depression Follow-up Plan: mental health care assessment (5/25/2017  1:00 PM)    Complete physical exam including:  General, HEENT, Neck, breast, back, CV, Pulmonary, Abdominal, extremities, skin, neuro, psych, lymph, genital exams all within normal.    Abnormalities include:  Left side of neck is a 1cm open cyst draining small amount of sersang fluid.  With pt's permission I was able to push out a large amount of debri c/w sebaceous cyst.  Pt tolerated well without pain.      Otherwise normal exam.       Results for orders placed or performed in visit on 05/25/17   Gynecologic Cytology (PAP Smear)   Result Value Ref Range    Case Report       Gynecologic Cytology Report                       Case: [id number]                                   Authorizing Provider:  Nakia Roldan MD     Collected:           05/25/2017 5101              Ordering Location:     [facility]  Received:            05/25/2017 3827                                     Medicine/OB                                                                  First Screen:          FATUMA Johnson (ASCP)                                                     Specimen:    SUREPATH PAP, SCREENING, " Endocervical/cervical                                             Interpretation       Negative for squamous intraepithelial lesion or malignancy    Result Flag Normal Normal    Specimen Adequacy       Satisfactory for evaluation, endocervical/transformation zone component present    HPV Reflex? Yes regardless of result     HIGH RISK No     LMP/Menopause Date menopause around 58yo     Abnormal Bleeding No     Pt Status na     Birth Control/Hormones None     Previous Normal/Date 2013     Prev Abn Date/Dx none     Cervical Appearance Normal    HPV Cascade (PCR)   Result Value Ref Range    Interpretation No HPV Type(s) Detected No HPV Type(s) Detected, No High Risk HPV Type(s) Detected, DNA Quantity Not Sufficient     Ameya Evangelista MD, [employer]

## 2021-06-11 NOTE — TELEPHONE ENCOUNTER
Left eye is red, noticed it when brushing teeth. Left side of the left eye.   No pain, no drainage. No rubbing it. No itching.   No change in vision. Pt stated it is red, like blood.    RN recommended in office appointment today or tomorrow. If unable to schedule in person RN recommended [facility] evaluation.     Patient stated understanding and was transferred to scheduling to make appointment.    Merissa Delaney RN 09/23/20 1:45 PM  [facility] Nurse Advisor      Reason for Disposition    Bleeding on white of the eye    Additional Information    Negative: Chemical got in the eye    Negative: Piece of something got in the eye    Negative: Followed an eye injury    Negative: Yellow or green pus in the eyes    Negative: Severe eye pain    Negative: Patient sounds very sick or weak to the triager    Negative: Blurred vision    Negative: Eyelid (outer) is very red    Negative: Eyelids are very swollen (shut or almost)    Negative: Cloudy spot or sore seen on the cornea (clear part of the eye)    Negative: Vomiting    Negative: Eye pain/discomfort that is more than mild    Negative: Recent eye surgery and has increasing eye pain    Negative: Foreign body sensation ('feels like something is in there')    Negative: Bleeding on white of the eye and is taking Coumadin or known bleeding disorder (e.g., thrombocytopenia)    Negative: Eye pain present > 24 hours    Negative: Red eyes present > 7 days    Negative: Only 1 eye is red, and persists > 48 hours    Negative: Patient wants to be seen    Protocols used: EYE - RED WITHOUT PUS-A-OH

## 2021-06-16 PROBLEM — Z71.89 ADVANCED DIRECTIVES, COUNSELING/DISCUSSION: Status: ACTIVE | Noted: 2020-07-06

## 2021-06-16 PROBLEM — D70.9 NEUTROPENIA (H): Status: ACTIVE | Noted: 2020-07-07

## 2021-06-16 PROBLEM — E55.9 VITAMIN D DEFICIENCY: Status: ACTIVE | Noted: 2020-07-06

## 2021-06-16 NOTE — TELEPHONE ENCOUNTER
LMTCB #1 to see if patient was interested in getting the COVID-19 Vaccine at [facility] on 3/27 if patient hasn't already.    If patient got it, please notate this information down and re-route it to the provider pool. Thanks.    Name of vaccine:  Place of vaccination:  Date of 1st dose:  Date of 2nd dose:  Lot #: (patient may or may not have this)

## 2021-06-18 NOTE — PATIENT INSTRUCTIONS - HE
Patient Instructions by Nakia Roldan MD at 7/6/2020  8:40 AM     Author: Nakia Roldan MD Service: -- Author Type: Physician    Filed: 7/6/2020  9:21 AM Encounter Date: 7/6/2020 Status: Addendum    : Nakia Roldan MD (Physician)    Related Notes: Original Note by Nakia Roldan MD (Physician) filed at 7/6/2020  9:03 AM       Call insurance to see if you should get your shigrix at pharmacy or clinic      Patient Education     Exercise for a Healthier Heart  You may wonder how you can improve the health of your heart. If youre thinking about exercise, youre on the right track. You dont need to become an athlete, but you do need a certain amount of brisk exercise to help strengthen your heart. If you have been diagnosed with a heart condition, your doctor may recommend exercise to help stabilize your condition. To help make exercise a habit, choose safe, fun activities.       Be sure to check with your health care provider before starting an exercise program.    Why exercise?  Exercising regularly offers many healthy rewards. It can help you do all of the following:    Improve your blood cholesterol levels to help prevent further heart trouble    Lower your blood pressure to help prevent a stroke or heart attack    Control diabetes, or reduce your risk of getting this disease    Improve your heart and lung function    Reach and maintain a healthy weight    Make your muscles stronger and more limber so you can stay active    Prevent falls and fractures by slowing the loss of bone mass (osteoporosis)    Manage stress better  Exercise tips  Ease into your routine. Set small goals. Then build on them.  Exercise on most days. Aim for a total of 150 or more minutes of moderate to  vigorous intensity activity each week. Consider 40 minutes, 3 to 4 times a week. For best results, activity should last for 40 minutes on average. It is OK to work up to the 40 minute period over time. Examples of  moderate-intensity activity is walking one mile in 15 minutes or 30 to 45 minutes of yard work.  Step up your daily activity level. Along with your exercise program, try being more active throughout the day. Walk instead of drive. Do more household tasks or yard work.  Choose one or more activities you enjoy. Walking is one of the easiest things you can do. You can also try swimming, riding a bike, or taking an exercise class.  Stop exercising and call your doctor if you:    Have chest pain or feel dizzy or lightheaded    Feel burning, tightness, pressure, or heaviness in your chest, neck, shoulders, back, or arms    Have unusual shortness of breath    Have increased joint or muscle pain    Have palpitations or an irregular heartbeat      3592-1418 [employer]. [address] [place] [id number]. All rights reserved. This information is not intended as a substitute for professional medical care. Always follow your healthcare professional's instructions.         Patient Education    Home Fire Safety  Each year, thousands of people, including children, are injured and killed in home fires. Children are often curious about fire, and may not understand the dangers. This makes home fire safety practices especially important. Three important things you can do to keep your home safe from fire are:    Install smoke alarms in your home and make sure they work properly.    Teach children not to play with matches, lighters, and other materials that can be used to start fires. And keep these materials out of childrens reach.    Teach children what to do in case of fire. Create a fire safety action plan and practice it.  Read on for more details about keeping your family and home safe from fire.        Being Prepared for a Fire  A home fire can happen at any time. The following can help you be prepared:    Install smoke alarms on every level of your home, including the basement and outside all sleeping  areas. This simple step cuts your familys risk of dying in a fire nearly in half.    Test smoke alarms monthly, and change the batteries once a year or when the alarm chirps.    Dont disable smoke alarms, even for a short time.    Ask your local fire department for tips on where to place smoke alarms in your home.    Replace all smoke alarms every 10 years.    Consider using voice smoke alarms. These alarms allow you to substitute your own voice for the alarm sound. They are helpful because many children dont wake up to the sound of a regular smoke alarm.    Install carbon monoxide detectors near sleeping areas.    Be aware that carbon monoxide is a byproduct of smoke that can be deadly. Its a gas that you cant see, smell, or taste.    Consider buying a combination smoke alarm / carbon monoxide detector.    Keep fire extinguishers in the home.    Keep them in accessible locations, especially in the kitchen.    Check usage dates to make sure they are not .    Use fire extinguishers only when the fire is in a contained area and is not spreading. (Otherwise, you should focus on getting out of the home.)    Train adults to use fire extinguishers. (Children should focus on getting out of the home during a fire.)    If you live in an apartment, talk to your landlord about where smoke alarms are and how often they are tested. Also ask about fire extinguisher locations and emergency exit routes.  Indoor Fire Safety  Many things in your home are potential fire hazards. Follow these steps to help keep your home safe.    Be careful in the kitchen.    Never leave food thats cooking unattended.    If a fire breaks out in a cooking pan, put a lid on it to smother it. And never throw water on a grease fire. It will make the fire worse.    Conduct a home safety inspection. Look for anything, such as frayed wires and cords, that can cause a fire. Fix or remove any fire safety hazards you find.    Keep all matches and lighters  in a secured drawer or cabinet out of the reach of children. Use childproof lighters.    Check to make sure all appliances, including the stove, are turned off before leaving the home.    Know where the gas main shut-off is located.    Make sure space heaters are stable and have protective covers. Keep them at least 3 feet from anything that can burn, such as curtains. Dont use space heaters in areas where young kids spend time alone.    Keep flammable liquids such as kerosene and gasoline locked up and safely stored away from kids and heat.    Keep all smoking materials out of reach of children. And never smoke in bed. If possible, smoke outdoors only.  Outdoor Fire Safety  Fire can be a hazard outdoors as well as indoors. When outdoors, be sure to do the following:    Always supervise kids near a barbecue grill, campfire, or portable stove.    Dont use fire pits around children. Kids can fall into them, and pits can be hot even after the fire goes out.    Keep a garden hose or fire extinguisher handy when cooking outdoors in case of fire.  Teaching Your Child About Fire Safety  One of the best ways to keep your home safe from fire is education. Make sure everyone in your family knows fire safety rules, including children.    Teach your children the dangers of matches, lighters, and other dangerous items.    Teach them to never touch these and other objects that are hot, such as candles.    Have them tell you right away whenever they find matches or lighters. Explain that these items are tools for grown-ups, not toys. And never amuse children with matches or lighters.    Round up all matches and lighters and store them safely. In case you missed some, ask your children to tell you where any are located throughout your home.    Never leave a child alone in a room with a lit candle. Dont allow teens to have candles in their rooms.    Show children what to do in case of fire.    Be sure your kids know what the fire  alarm sounds like and what to do if it goes off.    Teach kids what to do if their clothes catch fire: Stop, Drop to the ground, and Roll until the fire is put out. They should also cover their face with their hands. Practice these steps with your children. Make sure they understand that running will make the fire burn faster.    Show children how to crawl below smoke during a fire.    Make sure kids know at least two escape routes from each room in the home. These escape routes can be windows.    Teach kids to test doors for nearby fire by feeling for heat with the back of their hand. If the door is warm or hot, they should try their second exit.    Explain to children that they cant hide from a fire. Hiding in a closet or under a bed wont make them safe. Instead, they should try to escape the home. And if they cant escape, they should let others know they are trapped. They can do this by shutting the door to the room, opening a window, and turning on the lights.    Talk to your local fire department.    Introduce your children to a . Let them know that firefighters will look different when in full protective gear. Tell them to never hide from firefighters, and to follow all directions from firefighters during a fire.    Find out if the fire department has a fire safety program for kids.      Create a Fire Safety Plan  Create a plan for your family to follow in case of a fire. Try making it a family project. Important steps for the plan include leaving the home right away and having a designated meeting place.    Make sure your child understands to get out and stay out. He or she should get out of the home immediately and not go back in, even if family members or pets are still inside.    Decide on a safe meeting place away from the home for everyone to gather.    Teach children to call 911 or emergency services from a cell phone or neighbors phone. Make sure they know to do this only after they are  safely out of the home.    Teach your children the fire safety plan. Practice it and make sure they understand it.    Have fire drills twice a year to keep your children prepared in case of fire.    Visit the National Fire Protection Association web site at www.nfpa.org for more information.      4847-6052 [employer]. [address] [place] [address], [place] [address]. All rights reserved. This information is not intended as a substitute for professional medical care. Always follow your healthcare professional's instructions.           Advance Directive  Patients advance directive was discussed and I am comfortable with the patients wishes.  Patient Education   Personalized Prevention Plan  You are due for the preventive services outlined below.  Your care team is available to assist you in scheduling these services.  If you have already completed any of these items, please share that information with your care team to update in your medical record.  Health Maintenance   Topic Date Due   ? HIV SCREENING  09/08/1970   ? ZOSTER VACCINES (2 of 3) 07/20/2017   ? COLORECTAL CANCER SCREENING  07/31/2018   ? PNEUMOCOCCAL IMMUNIZATION 65+ LOW/MEDIUM RISK (1 of 2 - PCV13) 09/08/2019   ? DXA SCAN  09/08/2019   ? MEDICARE ANNUAL WELLNESS VISIT  07/02/2020   ? INFLUENZA VACCINE RULE BASED (1) 08/01/2020   ? FALL RISK ASSESSMENT  04/14/2021   ? MAMMOGRAM  07/02/2021   ? LIPID  07/02/2024   ? ADVANCE CARE PLANNING  07/02/2024   ? TD 18+ HE  06/29/2028   ? HEPATITIS C SCREENING  Completed   ? TDAP ADULT ONE TIME DOSE  Completed

## 2021-06-19 NOTE — PROGRESS NOTES
[facility] Health Maintenance Exam  [facility]      Assessment/Plan     1.  Healthy female exam.   Health maintenance discussed as appropriate for age and risk factors including:  Nutrition, exercise, alcohol use, tobacco use, safe sexual practices, dental health.  Cancer screening assessed and ordered as indicated. Routine labs as outlined below based on age and risk factors reviewed today. Immunizations reviewed and updated.       1. Routine general medical examination at a health care facility  Patient will return her color guard next fall once her insurance coverage kicks back in again for the school year.  - Td, Preservative Free (green label)    Return in about 1 year (around 6/29/2019) for Annual physical.    Patient Education/AVS:  There are no Patient Instructions on file for this visit.    HPI:      Chief Complaint   Patient presents with     Annual Exam        Gloria Quinn is a 62 y.o. female and is here for a comprehensive physical exam.     Other concerns today:   Right ear is itchy again- tends to get wax    Cologard arrived but she looses insurance tomorrow and so will wait until September when she restarts [employer] in the fall.      Left shoulder has a build up of wax that she would like checked out    Area of alopecia right side of scalp in 1998 that is getting bigger and consider derm consultation and treatment options in future.     Healthy Habits: Body mass index is 20.7 kg/(m^2).  Regular Exercise: treadmill 30min, stretching 15-30min daily, weights   Healthy Diet:dairy and calcium supplement, sweets, fruits/veggies, eggs, soy,   Smoking:   History   Smoking Status     Passive Smoke Exposure - Never Smoker   Smokeless Tobacco     Never Used     Comment: neighbors smoke     Dental Visits Regularly: Yes  Eye exams: Yes  Pregnancy planning: na   History   Sexual Activity     Sexual activity: No     Seat Belt: Yes  Prevention of Osteoporosis:weight bearing exercise, dairy, vitamin  D supplement  Last Dexa:na    Do you take any herbs or supplements that were not prescribed by a doctor? calcium with vitamin D and Mg, centrum MV      Cancer Screening:  Hemoccults/Colonoscopy: due for cologard this year  Mammogram:  Due   Pap Smear:  Normal  - repeat   Lung Cancer: na      Immunization History   Administered Date(s) Administered     Hep B, Adult 2017, 2017, 2018     Hepatitis A: Immune 2017     Influenza,seasonal quad, PF, 36+MOS 2016     Td, adult adsorbed, PF 10/01/1999, 2018     Td,adult,historic,unspecified 1954     Tdap 2009     ZOSTER, LIVE 2017       OB History    Para Term  AB Living   2 2 2   2   SAB TAB Ectopic Multiple Live Births             # Outcome Date GA Lbr [place]/2nd Weight Sex Delivery Anes PTL Lv   2 Term            1 Term                   Meds  No current outpatient prescriptions on file prior to visit.     No current facility-administered medications on file prior to visit.        Past Medical History  Past Medical History:   Diagnosis Date     Breast cyst     right       Surgical History  Past Surgical History:   Procedure Laterality Date     BREAST BIOPSY Right        Allergies  Review of patient's allergies indicates no known allergies.    Family History  Family History   Problem Relation Age of Onset     Cancer Mother      Hypertension Father      Diabetes Father      Heart disease Father      Dementia Sister      Diabetes Sister      Hypothyroidism Sister        Social History  Social History     Social History     Marital status:      Spouse name: N/A     Number of children: 2     Years of education: N/A     Occupational History     retired- [employer]      Volunteering with [employer]      Social History Main Topics     Smoking status: Passive Smoke Exposure - Never Smoker     Smokeless tobacco: Never Used      Comment: neighbors smoke     Alcohol use No     Drug use: No      "Sexual activity: No     Other Topics Concern     Not on file     Social History Narrative    Lives with daughter         Review of Systems   Complete ROS including General, HEENT, CV, Pulmonary, GI, , Genital, Neuro, Psych, MSK, Heme, Endocrine all within normal except as noted in the HPI.      Objective:   /70 (Patient Site: Right Arm, Patient Position: Sitting, Cuff Size: Adult Regular)  Pulse 81  Temp 98.3  F (36.8  C) (Oral)   Resp 18  Ht 4' 10.86\" (1.495 m)  Wt 102 lb (46.3 kg)  LMP 05/25/2013  SpO2 98%  BMI 20.7 kg/m2 Body mass index is 20.7 kg/(m^2).  Wt Readings from Last 3 Encounters:   06/29/18 102 lb (46.3 kg)   05/25/17 (!) 99 lb (44.9 kg)     PHQ-9 Total Score: 0 (6/29/2018  4:00 PM)  No Data Recorded  PHQ-2 Total Score: 0 (6/29/2018  4:00 PM)  Depression Follow-up Plan: mental health care assessment (6/29/2018  4:00 PM)    Complete physical exam including:  General, HEENT, Neck, breast, back, CV, Pulmonary, Abdominal, extremities, skin, neuro, psych, lymph exams all within normal.    Abnormalities include:  All normal.  Pelvic exam deferred as patient is asymptomatic and not due for screening today.     Results for orders placed or performed in visit on 06/01/18   Lipid Cascade   Result Value Ref Range    Cholesterol 221 (H) <=199 mg/dL    Triglycerides 108 <=149 mg/dL    HDL Cholesterol 75 >=50 mg/dL    LDL Calculated 124 <=129 mg/dL    Patient Fasting > 8hrs? Yes    Glucose FUTURE   Result Value Ref Range    Glucose 90 70 - 99 mg/dL    Patient Fasting > 8hrs? Yes    Hepatitis C Antibody (Anti-HCV)   Result Value Ref Range    Hepatitis C Ab Negative Negative       "

## 2021-06-26 ENCOUNTER — HEALTH MAINTENANCE LETTER (OUTPATIENT)
Age: 66
End: 2021-06-26

## 2021-07-21 ENCOUNTER — RECORDS - HEALTHEAST (OUTPATIENT)
Dept: ADMINISTRATIVE | Facility: CLINIC | Age: 66
End: 2021-07-21

## 2021-08-21 ENCOUNTER — HEALTH MAINTENANCE LETTER (OUTPATIENT)
Age: 66
End: 2021-08-21

## 2021-10-16 ENCOUNTER — HEALTH MAINTENANCE LETTER (OUTPATIENT)
Age: 66
End: 2021-10-16

## 2022-03-04 ENCOUNTER — TRANSFERRED RECORDS (OUTPATIENT)
Dept: HEALTH INFORMATION MANAGEMENT | Facility: CLINIC | Age: 67
End: 2022-03-04

## 2022-09-25 ENCOUNTER — HEALTH MAINTENANCE LETTER (OUTPATIENT)
Age: 67
End: 2022-09-25

## 2023-10-14 ENCOUNTER — HEALTH MAINTENANCE LETTER (OUTPATIENT)
Age: 68
End: 2023-10-14

## 2023-12-23 ENCOUNTER — HEALTH MAINTENANCE LETTER (OUTPATIENT)
Age: 68
End: 2023-12-23

## 2024-06-17 PROBLEM — Z71.89 ADVANCED DIRECTIVES, COUNSELING/DISCUSSION: Status: RESOLVED | Noted: 2020-07-06 | Resolved: 2024-06-17
